# Patient Record
Sex: MALE | URBAN - NONMETROPOLITAN AREA
[De-identification: names, ages, dates, MRNs, and addresses within clinical notes are randomized per-mention and may not be internally consistent; named-entity substitution may affect disease eponyms.]

---

## 2020-11-18 ENCOUNTER — OFFICE VISIT (OUTPATIENT)
Dept: URBAN - NONMETROPOLITAN AREA CLINIC 2 | Facility: CLINIC | Age: 55
End: 2020-11-18

## 2020-11-18 NOTE — HPI-TODAY'S VISIT:
The patient presents for evaluation of colorectal cancer screening. They deny any significant GI complaints. There is no report of rectal bleeding. They do not report any issues with anemia. They have never had a colonoscopy in the past. Otherwise the patient is healthy, risks and benefits were discussed and they agree to pursue colon cancer screening with colonoscopy. CS